# Patient Record
Sex: FEMALE | Race: WHITE | HISPANIC OR LATINO | Employment: FULL TIME | URBAN - METROPOLITAN AREA
[De-identification: names, ages, dates, MRNs, and addresses within clinical notes are randomized per-mention and may not be internally consistent; named-entity substitution may affect disease eponyms.]

---

## 2020-11-18 ENCOUNTER — OFFICE VISIT (OUTPATIENT)
Dept: INTERNAL MEDICINE | Age: 25
End: 2020-11-18

## 2020-11-18 VITALS
OXYGEN SATURATION: 98 % | WEIGHT: 232.6 LBS | TEMPERATURE: 97.5 F | HEIGHT: 64 IN | DIASTOLIC BLOOD PRESSURE: 80 MMHG | HEART RATE: 86 BPM | SYSTOLIC BLOOD PRESSURE: 130 MMHG | BODY MASS INDEX: 39.71 KG/M2

## 2020-11-18 DIAGNOSIS — E66.9 OBESITY (BMI 30-39.9): ICD-10-CM

## 2020-11-18 DIAGNOSIS — Z00.00 ROUTINE ADULT HEALTH MAINTENANCE: Primary | ICD-10-CM

## 2020-11-18 DIAGNOSIS — Z32.00 ENCOUNTER FOR PREGNANCY TEST, RESULT UNKNOWN: ICD-10-CM

## 2020-11-18 PROBLEM — Z20.822 SUSPECTED COVID-19 VIRUS INFECTION: Status: ACTIVE | Noted: 2020-05-13

## 2020-11-18 PROBLEM — O26.899 RH NEGATIVE STATE IN ANTEPARTUM PERIOD: Status: ACTIVE | Noted: 2020-04-16

## 2020-11-18 PROBLEM — J18.9 PNEUMONIA: Status: ACTIVE | Noted: 2020-05-13

## 2020-11-18 PROBLEM — Z67.91 RH NEGATIVE STATE IN ANTEPARTUM PERIOD: Status: ACTIVE | Noted: 2020-04-16

## 2020-11-18 PROBLEM — O43.199 MARGINAL INSERTION OF UMBILICAL CORD AFFECTING MANAGEMENT OF MOTHER: Status: ACTIVE | Noted: 2020-04-16

## 2020-11-18 PROBLEM — Z34.90 PREGNANCY: Status: ACTIVE | Noted: 2020-03-06

## 2020-11-18 PROBLEM — R59.9 ADENOPATHY: Status: ACTIVE | Noted: 2020-11-18

## 2020-11-18 PROBLEM — Z98.891 S/P C-SECTION: Status: ACTIVE | Noted: 2020-07-18

## 2020-11-18 PROBLEM — O02.1 MISSED AB: Status: ACTIVE | Noted: 2019-05-24

## 2020-11-18 PROBLEM — O99.210 OBESITY IN PREGNANCY: Status: ACTIVE | Noted: 2020-03-06

## 2020-11-18 PROCEDURE — 99385 PREV VISIT NEW AGE 18-39: CPT | Performed by: NURSE PRACTITIONER

## 2020-11-18 RX ORDER — NORGESTIMATE AND ETHINYL ESTRADIOL 0.25-0.035
1 KIT ORAL DAILY
COMMUNITY
Start: 2020-08-27 | End: 2021-08-27

## 2020-11-18 RX ORDER — ALBUTEROL SULFATE 90 UG/1
2 AEROSOL, METERED RESPIRATORY (INHALATION) EVERY 4 HOURS PRN
COMMUNITY
End: 2021-03-16 | Stop reason: SDUPTHER

## 2020-11-18 NOTE — PROGRESS NOTES
"[facility]ICINE  COLLEEN Zuniga      Renae RIZO Bottoms / 25 y.o. / female  11/18/2020      VITALS     Visit Vitals  /80   Pulse 86   Temp 97.5 °F (36.4 °C) (Temporal)   Ht 162.6 cm (64\")   Wt 106 kg (232 lb 9.6 oz)   LMP 10/21/2020 (Exact Date)   SpO2 98%   Breastfeeding No   BMI 39.93 kg/m²       BP Readings from Last 3 Encounters:   11/18/20 130/80   02/26/15 130/70     Wt Readings from Last 3 Encounters:   11/18/20 106 kg (232 lb 9.6 oz)   02/26/15 84.9 kg (187 lb 4.1 oz)      Body mass index is 39.93 kg/m².    MEDICATIONS     Current Outpatient Medications   Medication Sig Dispense Refill   • albuterol sulfate  (90 Base) MCG/ACT inhaler Inhale 2 puffs Every 4 (Four) Hours As Needed.     • norgestimate-ethinyl estradiol (ORTHO-CYCLEN) 0.25-35 MG-MCG per tablet Take 1 tablet by mouth Daily.       No current facility-administered medications for this visit.        _____________________________________________________________________________________    CHIEF COMPLAINT     Establish Care and Labs Only      HISTORY OF PRESENT ILLNESS     Renae presents for annual health maintenance visit, labs, and to establish care with office and provider.     She is currently established with obgyn and has a scheduled pap smear in December. She had a csection June of this year and has her first child. 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     Only other medical hisotry is pneumonia in May in which she was hospitalized due concurrent third trimester pregnancy. She has occasional residual wheezing in which she uses albuterol inhaler.     In October she miss a birth control dose and would also like HCG urine testing today.     · Last health maintenance visit: many years ago  · General health: fair  · Lifestyle:  · Attempting to lose weight?: Yes   · Diet: eats decently  · Exercise: exercises 4 days/week; started walking 10,000 steps   · Tobacco: Never used   · Alcohol: " occasional/social  · Work: Full-time; staffing agency   · Reproductive health:  · Sexually active?: No   · Sexual problems?: No problems  · Concern for STD?: No    · Sees Gynecologist?: Yes   · Meredith/Postmenopausal?: No   · Domestic abuse concerns: No   · Depression Screening:      PHQ-2/PHQ-9 Depression Screening 2020   Little interest or pleasure in doing things 0   Feeling down, depressed, or hopeless 0   Total Score 0         PHQ-2: 0 (Not depressed)   PHQ-9: 0 (Negative screening for depression)    Patient Care Team:  Ok Raya APRN as PCP - General (Internal Medicine)  Padmini White MD (Obstetrics and Gynecology)  ______________________________________________________________________    ALLERGIES  No Known Allergies     PFSH:     The following portions of the patient's history were reviewed and updated as appropriate: Allergies / Current Medications / Past Medical History / Surgical History / Social History / Family History    PROBLEM LIST   Patient Active Problem List   Diagnosis   • Adenopathy   • Full-term PROM with onset of labor within 24 hours of rupture   • Marginal insertion of umbilical cord affecting management of mother   • Missed ab   • Obesity in pregnancy   • Pneumonia   • Pregnancy   • Rh negative state in antepartum period   • S/P    • Suspected COVID-19 virus infection   • Obesity (BMI 30-39.9)       PAST MEDICAL HISTORY  Past Medical History:   Diagnosis Date   • Pneumonia 2020       SURGICAL HISTORY  Past Surgical History:   Procedure Laterality Date   •  SECTION      2020       SOCIAL HISTORY  Social History     Socioeconomic History   • Marital status:      Spouse name: Not on file   • Number of children: Not on file   • Years of education: Not on file   • Highest education level: Not on file   Tobacco Use   • Smoking status: Never Smoker   • Smokeless tobacco: Never Used   Substance and Sexual Activity   • Alcohol use: Yes     Comment: social    • Drug use: Never   • Sexual activity: Yes     Partners: Male     Birth control/protection: Other       FAMILY HISTORY  Family History   Problem Relation Age of Onset   • No Known Problems Mother    • Diabetes Father        IMMUNIZATION HISTORY  Immunization History   Administered Date(s) Administered   • HPV Quadrivalent 04/28/2016, 07/11/2016, 11/01/2016   • Influenza, Unspecified 01/05/2020, 10/05/2020   • Rho (D) Immune Globulin 05/21/2019, 05/24/2019, 05/07/2020, 07/19/2020   • Tdap 05/07/2020       ______________________________________________________________________    REVIEW OF SYSTEMS     Review of Systems   Constitutional: Negative.    HENT: Negative.    Eyes: Negative.    Respiratory: Negative.    Cardiovascular: Negative.    Gastrointestinal: Negative.    Endocrine: Negative.    Genitourinary: Negative.    Musculoskeletal:        Intermittent back pain after epidural in June with csection   Skin: Negative.    Allergic/Immunologic: Negative.    Neurological: Negative.    Hematological: Negative.    Psychiatric/Behavioral: Negative.      PHYSICAL EXAMINATION     Physical Exam  Constitutional:       Appearance: Normal appearance. She is obese.   HENT:      Head: Normocephalic.      Right Ear: Tympanic membrane, ear canal and external ear normal.      Left Ear: Tympanic membrane, ear canal and external ear normal.      Nose: Nose normal.      Mouth/Throat:      Mouth: Mucous membranes are moist.   Eyes:      Conjunctiva/sclera: Conjunctivae normal.      Pupils: Pupils are equal, round, and reactive to light.   Neck:      Musculoskeletal: Normal range of motion.      Thyroid: No thyromegaly.   Cardiovascular:      Rate and Rhythm: Normal rate and regular rhythm.      Pulses: Normal pulses.      Heart sounds: Normal heart sounds.   Pulmonary:      Effort: Pulmonary effort is normal.      Breath sounds: Normal breath sounds.   Abdominal:      Palpations: Abdomen is soft.      Tenderness: There is no  abdominal tenderness.   Genitourinary:     Comments: Deferred followed by obgyn  Musculoskeletal: Normal range of motion.      Right lower leg: No edema.      Left lower leg: No edema.   Lymphadenopathy:      Cervical: No cervical adenopathy.      Right cervical: No superficial, deep or posterior cervical adenopathy.     Left cervical: No superficial, deep or posterior cervical adenopathy.   Skin:     General: Skin is warm and dry.   Neurological:      Mental Status: She is alert and oriented to person, place, and time.   Psychiatric:         Thought Content: Thought content normal.         Judgment: Judgment normal.        REVIEWED DATA      Labs:    Lab Results   Component Value Date     07/26/2020    K 4.3 07/26/2020    CALCIUM 9.7 07/26/2020    AST 25 07/26/2020    ALT 30 07/26/2020    BUN 16 07/26/2020    CREATININE 0.7 07/26/2020    CREATININE 0.7 07/20/2020    CREATININE 0.7 07/19/2020    EGFRIFNONA >60 02/26/2015    EGFRIFAFRI >60 02/26/2015       Lab Results   Component Value Date    GLU 80 07/26/2020       No results found for: LDL, HDL, TRIG, CHOLHDLRATIO    No results found for: VXOH48IT     Lab Results   Component Value Date    WBC 10.94 07/26/2020    HGB 10.8 (L) 07/26/2020    MCV 82.2 07/26/2020     (H) 07/26/2020       No results found for: PROTEIN, GLUCOSEU, BLOODU, NITRITEU, LEUKOCYTESUR       Lab Results   Component Value Date    HEPCVIRUSABY Nonreactive 06/26/2020       Imaging:        Medical Tests:        ASSESSMENT & PLAN     ANNUAL WELLNESS EXAM / PHYSICAL     Other medical problems addressed today:      Summary/Discussion:     · For obesity: Decrease/eliminate soda, caffeine, alcohol and overall caloric intake. Reduce carbohydrates and sweets in diet.  Continue to improve dietary habits with lean proteins, fresh vegetables, fruits, and nuts. Improve aerobic exercise: walking/biking/swimming daily as tolerated, recommend 30 minutes/day at least 5 days/week.  · Routine health  maintenance labs performed today. Patient acceptable to review over mychart.   · Annual physical in one year with fasting labs as long as labs today unremarkable.     Next Appointment with me: Visit date not found    Return in about 1 year (around 11/18/2021) for Annual physical.      HEALTHCARE MAINTENANCE ISSUES     Cancer Screening:  · Colon: Initial/Next screening at age: 44  · Repeat colon cancer screening: N/A at this time  · Breast: Recommended monthly self exams; annual professional exam  · Mammogram: N/A at this time  · Cervical: 3 years  · Skin: Monthly self skin examination, annual exam by health professional  · Lung: Does not meet criteria for lung cancer screening.   · Other:    Screening Labs & Tests:  · Lab results reviewed & discussed with the patient or test orders placed today.  · EKG:  · CV Screening: Lipid panel  · DEXA (65+ or postmenopausal with risk factors):   · HEP C (If born 1952-4545, or risk factors): Ordered  · Other:     Immunization/Vaccinations (to be given today unless deferred by patient)  · Influenza: Patient had the flu shot this season  · Hepatitis A: Declined by patient  · Tetanus/Pertussis: Up to date  · Pneumovax: Not needed at this time  · Shingles: Not needed at this time  · Other:     Lifestyle Counseling:  · Lifestyle Modifications: Attempt to lose weight, Continue good lifestyle choices/modifications, Improve dietary compliance, Maintain a low sugar/carbohydrate diet and Follow a low fat, low cholesterol diet  · Safety Issues: Always wear seatbelt, Avoid texting while driving   · Use sunscreen, regular skin examination  · Recommended annual dental/vision examination.  · Emotional/Stress/Sleep: Reviewed and  given when appropriate      Health Maintenance   Topic Date Due   • ANNUAL PHYSICAL  09/05/1997   • PAP SMEAR  11/18/2020   • TDAP/TD VACCINES (2 - Td) 05/07/2030   • HEPATITIS C SCREENING  Completed   • INFLUENZA VACCINE  Completed   • HPV VACCINES  Completed    • Pneumococcal Vaccine 0-64  Aged Out         Examiner was wearing KN95 mask, face shield and exam gloves during the entire duration of the visit. Patient was masked the entire time.   Minimum social distance of 6 ft maintained entire visit except if physical contact was necessary as documented.     **Dragon Disclaimer:   Much of this encounter note is an electronic transcription/translation of spoken language to printed text. The electronic translation of spoken language may permit erroneous, or at times, nonsensical words or phrases to be inadvertently transcribed. Although I have reviewed the note for such errors, some may still exist.

## 2020-11-19 LAB
ALBUMIN SERPL-MCNC: 4.5 G/DL (ref 3.5–5.2)
ALBUMIN/GLOB SERPL: 1.5 G/DL
ALP SERPL-CCNC: 95 U/L (ref 39–117)
ALT SERPL-CCNC: 17 U/L (ref 1–33)
APPEARANCE UR: ABNORMAL
AST SERPL-CCNC: 13 U/L (ref 1–32)
BACTERIA #/AREA URNS HPF: ABNORMAL /HPF
BASOPHILS # BLD AUTO: 0.04 10*3/MM3 (ref 0–0.2)
BASOPHILS NFR BLD AUTO: 0.5 % (ref 0–1.5)
BILIRUB SERPL-MCNC: 0.2 MG/DL (ref 0–1.2)
BILIRUB UR QL STRIP: NEGATIVE
BUN SERPL-MCNC: 9 MG/DL (ref 6–20)
BUN/CREAT SERPL: 11.8 (ref 7–25)
CALCIUM SERPL-MCNC: 9.4 MG/DL (ref 8.6–10.5)
CHLORIDE SERPL-SCNC: 105 MMOL/L (ref 98–107)
CHOLEST SERPL-MCNC: 154 MG/DL (ref 0–200)
CHOLEST/HDLC SERPL: 2.66 {RATIO}
CO2 SERPL-SCNC: 24.9 MMOL/L (ref 22–29)
COLOR UR: YELLOW
CREAT SERPL-MCNC: 0.76 MG/DL (ref 0.57–1)
CRYSTALS URNS MICRO: ABNORMAL
EOSINOPHIL # BLD AUTO: 0.37 10*3/MM3 (ref 0–0.4)
EOSINOPHIL NFR BLD AUTO: 4.7 % (ref 0.3–6.2)
EPI CELLS #/AREA URNS HPF: ABNORMAL /HPF
ERYTHROCYTE [DISTWIDTH] IN BLOOD BY AUTOMATED COUNT: 14.2 % (ref 12.3–15.4)
GLOBULIN SER CALC-MCNC: 3 GM/DL
GLUCOSE SERPL-MCNC: 70 MG/DL (ref 65–99)
GLUCOSE UR QL: NEGATIVE
HBA1C MFR BLD: 5.5 % (ref 4.8–5.6)
HCT VFR BLD AUTO: 40.6 % (ref 34–46.6)
HCV AB S/CO SERPL IA: <0.1 S/CO RATIO (ref 0–0.9)
HDLC SERPL-MCNC: 58 MG/DL (ref 40–60)
HGB BLD-MCNC: 13.1 G/DL (ref 12–15.9)
HGB UR QL STRIP: ABNORMAL
IMM GRANULOCYTES # BLD AUTO: 0.03 10*3/MM3 (ref 0–0.05)
IMM GRANULOCYTES NFR BLD AUTO: 0.4 % (ref 0–0.5)
KETONES UR QL STRIP: NEGATIVE
LDLC SERPL CALC-MCNC: 77 MG/DL (ref 0–100)
LEUKOCYTE ESTERASE UR QL STRIP: NEGATIVE
LYMPHOCYTES # BLD AUTO: 2.67 10*3/MM3 (ref 0.7–3.1)
LYMPHOCYTES NFR BLD AUTO: 33.8 % (ref 19.6–45.3)
MCH RBC QN AUTO: 24.9 PG (ref 26.6–33)
MCHC RBC AUTO-ENTMCNC: 32.3 G/DL (ref 31.5–35.7)
MCV RBC AUTO: 77 FL (ref 79–97)
MONOCYTES # BLD AUTO: 0.38 10*3/MM3 (ref 0.1–0.9)
MONOCYTES NFR BLD AUTO: 4.8 % (ref 5–12)
MUCOUS THREADS URNS QL MICRO: ABNORMAL /HPF
NEUTROPHILS # BLD AUTO: 4.42 10*3/MM3 (ref 1.7–7)
NEUTROPHILS NFR BLD AUTO: 55.8 % (ref 42.7–76)
NITRITE UR QL STRIP: NEGATIVE
NRBC BLD AUTO-RTO: 0 /100 WBC (ref 0–0.2)
PH UR STRIP: 5.5 [PH] (ref 5–8)
PLATELET # BLD AUTO: 239 10*3/MM3 (ref 140–450)
POTASSIUM SERPL-SCNC: 4.8 MMOL/L (ref 3.5–5.2)
PROT SERPL-MCNC: 7.5 G/DL (ref 6–8.5)
PROT UR QL STRIP: NEGATIVE
RBC # BLD AUTO: 5.27 10*6/MM3 (ref 3.77–5.28)
RBC #/AREA URNS HPF: ABNORMAL /HPF
SODIUM SERPL-SCNC: 142 MMOL/L (ref 136–145)
SP GR UR: 1.02 (ref 1–1.03)
T4 FREE SERPL-MCNC: 1.44 NG/DL (ref 0.93–1.7)
TRIGL SERPL-MCNC: 107 MG/DL (ref 0–150)
TSH SERPL DL<=0.005 MIU/L-ACNC: 0.63 UIU/ML (ref 0.27–4.2)
UROBILINOGEN UR STRIP-MCNC: ABNORMAL MG/DL
VLDLC SERPL CALC-MCNC: 19 MG/DL (ref 5–40)
WBC # BLD AUTO: 7.91 10*3/MM3 (ref 3.4–10.8)
WBC #/AREA URNS HPF: ABNORMAL /HPF

## 2021-02-23 ENCOUNTER — TELEPHONE (OUTPATIENT)
Dept: INTERNAL MEDICINE | Age: 26
End: 2021-02-23

## 2021-03-16 RX ORDER — ALBUTEROL SULFATE 90 UG/1
2 AEROSOL, METERED RESPIRATORY (INHALATION) EVERY 4 HOURS PRN
Qty: 6.7 G | Refills: 11 | Status: SHIPPED | OUTPATIENT
Start: 2021-03-16

## 2021-03-22 ENCOUNTER — HOSPITAL ENCOUNTER (OUTPATIENT)
Dept: GENERAL RADIOLOGY | Facility: HOSPITAL | Age: 26
Discharge: HOME | End: 2021-03-22
Admitting: NURSE PRACTITIONER

## 2021-03-22 ENCOUNTER — OFFICE VISIT (OUTPATIENT)
Dept: INTERNAL MEDICINE | Age: 26
End: 2021-03-22

## 2021-03-22 VITALS
WEIGHT: 234.2 LBS | OXYGEN SATURATION: 99 % | BODY MASS INDEX: 39.98 KG/M2 | HEART RATE: 79 BPM | TEMPERATURE: 98.2 F | HEIGHT: 64 IN | SYSTOLIC BLOOD PRESSURE: 120 MMHG | DIASTOLIC BLOOD PRESSURE: 80 MMHG

## 2021-03-22 DIAGNOSIS — R05.9 COUGH: ICD-10-CM

## 2021-03-22 DIAGNOSIS — Z91.09 ENVIRONMENTAL ALLERGIES: Primary | ICD-10-CM

## 2021-03-22 PROCEDURE — 71046 X-RAY EXAM CHEST 2 VIEWS: CPT

## 2021-03-22 PROCEDURE — 99213 OFFICE O/P EST LOW 20 MIN: CPT | Performed by: NURSE PRACTITIONER

## 2021-03-22 RX ORDER — CETIRIZINE HYDROCHLORIDE 10 MG/1
10 TABLET ORAL DAILY
Qty: 30 TABLET | Refills: 11 | Status: SHIPPED | OUTPATIENT
Start: 2021-03-22 | End: 2021-04-13 | Stop reason: ALTCHOICE

## 2021-03-22 NOTE — PROGRESS NOTES
"    I N T E R N A L  M E D I C I N E  CLYDE MCCLELLAND, APRN      ENCOUNTER DATE:  03/22/2021    Renae Mcneill / 25 y.o. / female      CHIEF COMPLAINT / REASON FOR OFFICE VISIT     Cough (productive, green x1.5 wk)      ASSESSMENT & PLAN     1. Environmental allergies  - Continue albuterol as needed  - Ambulatory Referral to Allergy  - Zyrtec 10mg daily     2. Cough  - XR Chest PA & Lateral    Orders Placed This Encounter   Procedures   • XR Chest PA & Lateral   • Ambulatory Referral to Allergy     New Medications Ordered This Visit   Medications   • cetirizine (zyrTEC) 10 MG tablet     Sig: Take 1 tablet by mouth Daily.     Dispense:  30 tablet     Refill:  11       SUMMARY/DISCUSSION  • Follow-up as scheduled, earlier pending imaging.     Next Appointment with me: Visit date not found    No follow-ups on file.      VITAL SIGNS     Visit Vitals  /80   Pulse 79   Temp 98.2 °F (36.8 °C) (Temporal)   Ht 162.6 cm (64\")   Wt 106 kg (234 lb 3.2 oz)   LMP 03/10/2021   SpO2 99%   BMI 40.20 kg/m²       Wt Readings from Last 3 Encounters:   03/22/21 106 kg (234 lb 3.2 oz)   11/18/20 106 kg (232 lb 9.6 oz)   02/26/15 84.9 kg (187 lb 4.1 oz)     Body mass index is 40.2 kg/m².      MEDICATIONS AT THE TIME OF OFFICE VISIT     Current Outpatient Medications on File Prior to Visit   Medication Sig   • albuterol sulfate  (90 Base) MCG/ACT inhaler Inhale 2 puffs Every 4 (Four) Hours As Needed for Shortness of Air.   • norgestimate-ethinyl estradiol (ORTHO-CYCLEN) 0.25-35 MG-MCG per tablet Take 1 tablet by mouth Daily.     No current facility-administered medications on file prior to visit.         HISTORY OF PRESENT ILLNESS     Patient presents with symptoms for 8 days, starting this past Sunday with nasal congestion, sinus pressure and headache. She developed a 102 fever at 0500 that day which quickly resolved. She was tested at the [facility] clinic the day after which was negative. She subsequently developed a productive " cough which has been improving, but has still had to use her albuterol inhaler on a daily basis since symptoms began. Hx pneumonia in May.     REVIEW OF SYSTEMS     Constitutional neg except per HPI   Resp neg except cough  CV neg    PHYSICAL EXAMINATION     Physical Exam  Constitutional  No distress  Cardiovascular Rate  normal . Rhythm: regular . Heart sounds:  normal  Pulmonary/Chest  Effort normal. Breath sounds:  normal  Psychiatric  Alert. Judgment and thought content normal. Mood normal     REVIEWED DATA     Labs:         Imaging:           Medical Tests:             Summary of old records / correspondence / consultant report:           Request outside records:           *Examiner was wearing medical surgical mask, face shield and exam gloves during the entire duration of the visit. Patient was masked the entire time.   Minimum social distance of 6 ft maintained entire visit except if physical contact was necessary as documented.     **Dragon Disclaimer:   Much of this encounter note is an electronic transcription/translation of spoken language to printed text. The electronic translation of spoken language may permit erroneous, or at times, nonsensical words or phrases to be inadvertently transcribed. Although I have reviewed the note for such errors, some may still exist.

## 2021-04-13 RX ORDER — KETOTIFEN FUMARATE 0.35 MG/ML
SOLUTION/ DROPS OPHTHALMIC
COMMUNITY
Start: 2021-04-01

## 2021-04-13 RX ORDER — SUMATRIPTAN 100 MG/1
TABLET, FILM COATED ORAL
COMMUNITY
Start: 2021-04-01

## 2021-04-13 RX ORDER — FEXOFENADINE HCL 180 MG/1
180 TABLET ORAL DAILY
COMMUNITY
Start: 2021-04-01

## 2021-04-16 ENCOUNTER — BULK ORDERING (OUTPATIENT)
Dept: CASE MANAGEMENT | Facility: OTHER | Age: 26
End: 2021-04-16

## 2021-04-16 DIAGNOSIS — Z23 IMMUNIZATION DUE: ICD-10-CM

## 2024-02-27 ENCOUNTER — OFFICE VISIT (OUTPATIENT)
Dept: SURGERY | Facility: CLINIC | Age: 29
End: 2024-02-27
Payer: COMMERCIAL

## 2024-02-27 VITALS
HEIGHT: 64 IN | OXYGEN SATURATION: 95 % | DIASTOLIC BLOOD PRESSURE: 78 MMHG | SYSTOLIC BLOOD PRESSURE: 110 MMHG | HEART RATE: 76 BPM | BODY MASS INDEX: 42.29 KG/M2 | WEIGHT: 247.7 LBS

## 2024-02-27 DIAGNOSIS — K62.5 RECTAL BLEEDING: ICD-10-CM

## 2024-02-27 DIAGNOSIS — K64.4 EXTERNAL HEMORRHOIDS WITH COMPLICATION: Primary | ICD-10-CM

## 2024-02-27 PROCEDURE — 99204 OFFICE O/P NEW MOD 45 MIN: CPT | Performed by: PHYSICIAN ASSISTANT

## 2024-02-27 RX ORDER — HYDROCORTISONE 25 MG/G
CREAM TOPICAL
Qty: 30 G | Refills: 1 | Status: SHIPPED | OUTPATIENT
Start: 2024-02-27 | End: 2024-02-27

## 2024-02-27 RX ORDER — HYDROCORTISONE 25 MG/G
CREAM TOPICAL
Qty: 30 G | Refills: 1 | Status: SHIPPED | OUTPATIENT
Start: 2024-02-27

## 2024-02-27 NOTE — PROGRESS NOTES
"Renae Mcneill is a 28 y.o. female in for follow up of hemorrhoids.    Has had issues with constipation and hemorrhoids during pregnancy in 2020. Symptoms improved after giving birth. Then was pregnant with twins, which exacerbated symptoms again. Gave birth to twins in July 2023. Bleeding at least every other day, usually just on toilet paper, sometimes on stool. Painful and itchy. Has BM at least twice per day. Stool consistency is pasty. No straining with BMs. No current fiber supplements or stool softeners. Was previously using [employer] brand hemorrhoid cream, which helped to soothe symptoms slightly, but it didn't shrink the hemorrhoids.     No known family history of colon cancer or colon polyps. No prior colonoscopy.    /78 (BP Location: Left arm, Patient Position: Sitting, Cuff Size: Large Adult)   Pulse 76   Ht 162.6 cm (64\")   Wt 112 kg (247 lb 11.2 oz)   SpO2 95%   BMI 42.52 kg/m²   Body mass index is 42.52 kg/m².  -  Physical Exam  No acute distress  Chaperone present  Perianal exam: external hemorrhoids mildly to moderately enlarged with 4 skin tags noted. CALEB: No masses.  Pain elicited. Anoscopy deferred due to patient discomfort.    Assessment: No diagnosis found.     Plan:  ***    {Time Spent (Optional):68328}    Rosalva Woods PA-C  Physician Assistant  Colorectal Surgery    "

## 2024-02-27 NOTE — PROGRESS NOTES
Renae Mcneill is a 28 y.o. female who is seen as a self-referred a consult for hemorrhoids.      HPI:  Has had issues with constipation and hemorrhoids during pregnancy in . Symptoms improved after giving birth. Then was pregnant with twins, which exacerbated symptoms again. Gave birth to twins in 2023. Bleeding at least every other day, usually just on toilet paper, sometimes on stool. Painful and itchy. Has BM at least twice per day. Stool consistency is pasty. No straining with BMs. No current fiber supplements or stool softeners. Was previously using [employer] brand hemorrhoid cream, which helped to soothe symptoms slightly, but it didn't shrink the hemorrhoids.      No known family history of colon cancer or colon polyps. No prior colonoscopy.    Past Medical History:   Diagnosis Date    Anemia 2023    History of transfusion 2023    Pneumonia 2020    Rectal bleeding     Duribg First pregnancy       Past Surgical History:   Procedure Laterality Date     SECTION N/A 2020       Social History:   reports that she has never smoked. She has never been exposed to tobacco smoke. She has never used smokeless tobacco. She reports that she does not currently use alcohol. She reports that she does not use drugs.      Marriage status:     Family History   Problem Relation Age of Onset    No Known Problems Mother     Diabetes Father        No current outpatient medications on file.    Allergy  Patient has no known allergies.    Vitals:    24 1054   BP: 110/78   Pulse: 76   SpO2: 95%   -  Body mass index is 42.52 kg/m².    Physical Exam  No acute distress  Chaperone present  Perianal exam: external hemorrhoids mildly to moderately enlarged with 4 skin tags noted. CALEB: No masses.  Pain elicited. Anoscopy deferred due to patient discomfort.     Assessment:  1. External hemorrhoids with complication    2. Rectal bleeding        Plan:  For the hemorrhoids, I advised the patient  to start a fiber supplement to a daily total intake of 30 to 40 g for stool consistency.  I provided detailed instructions.  I encouraged adequate water intake and MiraLAX as needed to keep stools soft.  I prescribed Anusol cream for hemorrhoids and gave instructions for use, including that cream is not for daily long-term use. Follow up in 4-6 weeks for reevaluation. Call with any questions, concerns, or worsening symptoms.        Rosalva Woods PA-C  Physician Assistant  Colorectal Surgery

## 2024-03-01 ENCOUNTER — PATIENT ROUNDING (BHMG ONLY) (OUTPATIENT)
Dept: SURGERY | Facility: CLINIC | Age: 29
End: 2024-03-01
Payer: COMMERCIAL

## 2024-03-01 NOTE — PROGRESS NOTES
March 1, 2024    Hello, may I speak with Renae Mcneill?    My name is Husam      I am  with [facility]  [address]  [place] [address] [phone].    Before we get started may I verify your date of birth? 1995    I am calling to officially welcome you to our practice and ask about your recent visit. Is this a good time to talk? yes    Tell me about your visit with us. What things went well?  Everything was fine.        We're always looking for ways to make our patients' experiences even better. Do you have recommendations on ways we may improve?  no    Overall were you satisfied with your first visit to our practice? yes       I appreciate you taking the time to speak with me today. Is there anything else I can do for you? no      Thank you, and have a great day.

## 2024-04-08 PROBLEM — Z30.2 REQUEST FOR STERILIZATION: Status: ACTIVE | Noted: 2021-07-08

## 2024-04-08 PROBLEM — J45.40 MODERATE PERSISTENT ASTHMA: Status: ACTIVE | Noted: 2021-07-08

## 2024-04-08 PROBLEM — Z87.59 HISTORY OF MISCARRIAGE: Status: ACTIVE | Noted: 2022-12-20

## 2024-04-08 PROBLEM — O14.20 HELLP (HEMOLYTIC ANEMIA/ELEV LIVER ENZYMES/LOW PLATELETS IN PREGNANCY): Status: ACTIVE | Noted: 2023-07-04

## 2024-04-08 RX ORDER — ALBUTEROL SULFATE 90 UG/1
2 AEROSOL, METERED RESPIRATORY (INHALATION)
COMMUNITY
Start: 2023-11-04

## 2024-04-22 ENCOUNTER — OFFICE VISIT (OUTPATIENT)
Dept: SURGERY | Facility: CLINIC | Age: 29
End: 2024-04-22
Payer: COMMERCIAL

## 2024-04-22 VITALS
HEART RATE: 95 BPM | WEIGHT: 252.3 LBS | HEIGHT: 64 IN | BODY MASS INDEX: 43.07 KG/M2 | OXYGEN SATURATION: 98 % | DIASTOLIC BLOOD PRESSURE: 60 MMHG | SYSTOLIC BLOOD PRESSURE: 114 MMHG

## 2024-04-22 DIAGNOSIS — K62.5 RECTAL BLEEDING: ICD-10-CM

## 2024-04-22 DIAGNOSIS — K64.4 ANAL SKIN TAG: Primary | ICD-10-CM

## 2024-04-22 PROCEDURE — 46600 DIAGNOSTIC ANOSCOPY SPX: CPT | Performed by: PHYSICIAN ASSISTANT

## 2024-04-22 PROCEDURE — 99213 OFFICE O/P EST LOW 20 MIN: CPT | Performed by: PHYSICIAN ASSISTANT

## 2024-04-22 RX ORDER — SODIUM CHLORIDE, SODIUM LACTATE, POTASSIUM CHLORIDE, CALCIUM CHLORIDE 600; 310; 30; 20 MG/100ML; MG/100ML; MG/100ML; MG/100ML
30 INJECTION, SOLUTION INTRAVENOUS CONTINUOUS
OUTPATIENT
Start: 2024-04-22

## 2024-04-22 NOTE — PROGRESS NOTES
"Renae Mcneill is a 28 y.o. female in for follow up of hemorrhoids, rectal bleeding.    Rectal bleeding and pain have subsided. Having difficulty with hygiene, as well as irritation, due to extra tissue around anus. Fiber has helped stool consistency. Has BM 2 times per day. [place] 5 stool consistency. Sometimes strains if forgets fiber pill. Taking 1 fiber capsule daily.  Cream helped.     /60 (BP Location: Left arm, Patient Position: Sitting, Cuff Size: Large Adult)   Pulse 95   Ht 162.6 cm (64\")   Wt 114 kg (252 lb 4.8 oz)   LMP  (LMP Unknown)   SpO2 98%   Breastfeeding No   BMI 43.31 kg/m²   Body mass index is 43.31 kg/m².  -  Physical Exam  No acute distress  Chaperone present  Perianal exam: medium to large circumferential skin tags. CALEB: no masses. Anoscopy performed: Grade 2-3 x 3 internal hemorrhoids    Assessment:   1. Anal skin tag    2. Rectal bleeding       Plan:  Schedule colonoscopy and excision of anal skin tag. I discussed risks, benefits, and alternatives with the patient, including pain, bleeding, infection, scar tissue formation, and injury to surrounding structures. The patient had the opportunity to ask questions.  I answered all questions. Patient understands and wishes to proceed with procedure.     Rosalva Woods PA-C  Physician Assistant  Colorectal Surgery    "

## 2024-05-29 ENCOUNTER — TELEPHONE (OUTPATIENT)
Dept: SURGERY | Facility: CLINIC | Age: 29
End: 2024-05-29

## 2024-05-29 NOTE — TELEPHONE ENCOUNTER
Caller: Renae Mcneill    Relationship to patient: Self    Best call back number: [phone]    Patient is needing: PATIENT HAS SOME QUESTIONS REGARDING HER PREP FOR HER COLONOSCOPY ON THIS FRIDAY. PLEASE CALL.

## 2024-05-31 ENCOUNTER — TELEPHONE (OUTPATIENT)
Dept: SURGERY | Facility: CLINIC | Age: 29
End: 2024-05-31
Payer: COMMERCIAL

## 2024-05-31 ENCOUNTER — ANESTHESIA (OUTPATIENT)
Dept: GASTROENTEROLOGY | Facility: HOSPITAL | Age: 29
End: 2024-05-31
Payer: COMMERCIAL

## 2024-05-31 ENCOUNTER — ANESTHESIA EVENT (OUTPATIENT)
Dept: GASTROENTEROLOGY | Facility: HOSPITAL | Age: 29
End: 2024-05-31
Payer: COMMERCIAL

## 2024-05-31 ENCOUNTER — HOSPITAL ENCOUNTER (OUTPATIENT)
Facility: HOSPITAL | Age: 29
Setting detail: HOSPITAL OUTPATIENT SURGERY
Discharge: HOME | End: 2024-05-31
Attending: COLON & RECTAL SURGERY | Admitting: COLON & RECTAL SURGERY
Payer: COMMERCIAL

## 2024-05-31 VITALS
DIASTOLIC BLOOD PRESSURE: 79 MMHG | WEIGHT: 248.6 LBS | HEART RATE: 82 BPM | HEIGHT: 64 IN | RESPIRATION RATE: 20 BRPM | BODY MASS INDEX: 42.44 KG/M2 | SYSTOLIC BLOOD PRESSURE: 115 MMHG | OXYGEN SATURATION: 98 %

## 2024-05-31 DIAGNOSIS — K64.4 ANAL SKIN TAG: ICD-10-CM

## 2024-05-31 DIAGNOSIS — K62.5 RECTAL BLEEDING: ICD-10-CM

## 2024-05-31 PROCEDURE — 45378 DIAGNOSTIC COLONOSCOPY: CPT | Performed by: COLON & RECTAL SURGERY

## 2024-05-31 PROCEDURE — 25010000002 PHENYLEPHRINE 10 MG/ML SOLUTION

## 2024-05-31 PROCEDURE — 25810000003 LACTATED RINGERS PER 1000 ML: Performed by: PHYSICIAN ASSISTANT

## 2024-05-31 PROCEDURE — 46230 REMOVAL OF ANAL TAGS: CPT | Performed by: COLON & RECTAL SURGERY

## 2024-05-31 PROCEDURE — 88304 TISSUE EXAM BY PATHOLOGIST: CPT | Performed by: COLON & RECTAL SURGERY

## 2024-05-31 PROCEDURE — 25010000002 PROPOFOL 1000 MG/100ML EMULSION

## 2024-05-31 PROCEDURE — 25010000002 KETOROLAC TROMETHAMINE PER 15 MG

## 2024-05-31 RX ORDER — LIDOCAINE 50 MG/G
1 OINTMENT TOPICAL EVERY 4 HOURS PRN
Qty: 35.44 G | Refills: 4 | Status: SHIPPED | OUTPATIENT
Start: 2024-05-31 | End: 2024-06-30

## 2024-05-31 RX ORDER — LIDOCAINE HYDROCHLORIDE 20 MG/ML
INJECTION, SOLUTION INFILTRATION; PERINEURAL AS NEEDED
Status: DISCONTINUED | OUTPATIENT
Start: 2024-05-31 | End: 2024-05-31 | Stop reason: SURG

## 2024-05-31 RX ORDER — SODIUM CHLORIDE 9 MG/ML
40 INJECTION, SOLUTION INTRAVENOUS AS NEEDED
Status: CANCELLED | OUTPATIENT
Start: 2024-05-31

## 2024-05-31 RX ORDER — PROPOFOL 10 MG/ML
INJECTION, EMULSION INTRAVENOUS CONTINUOUS PRN
Status: DISCONTINUED | OUTPATIENT
Start: 2024-05-31 | End: 2024-05-31 | Stop reason: SURG

## 2024-05-31 RX ORDER — HYDROCODONE BITARTRATE AND ACETAMINOPHEN 5; 325 MG/1; MG/1
TABLET ORAL
Qty: 32 TABLET | Refills: 0 | Status: SHIPPED | OUTPATIENT
Start: 2024-05-31

## 2024-05-31 RX ORDER — SODIUM CHLORIDE 0.9 % (FLUSH) 0.9 %
10 SYRINGE (ML) INJECTION AS NEEDED
Status: CANCELLED | OUTPATIENT
Start: 2024-05-31

## 2024-05-31 RX ORDER — PHENYLEPHRINE HYDROCHLORIDE 10 MG/ML
INJECTION INTRAVENOUS AS NEEDED
Status: DISCONTINUED | OUTPATIENT
Start: 2024-05-31 | End: 2024-05-31 | Stop reason: SURG

## 2024-05-31 RX ORDER — SODIUM CHLORIDE, SODIUM LACTATE, POTASSIUM CHLORIDE, CALCIUM CHLORIDE 600; 310; 30; 20 MG/100ML; MG/100ML; MG/100ML; MG/100ML
30 INJECTION, SOLUTION INTRAVENOUS CONTINUOUS
Status: DISCONTINUED | OUTPATIENT
Start: 2024-05-31 | End: 2024-05-31 | Stop reason: HOSPADM

## 2024-05-31 RX ORDER — BUPIVACAINE HYDROCHLORIDE AND EPINEPHRINE 5; 5 MG/ML; UG/ML
INJECTION, SOLUTION EPIDURAL; INTRACAUDAL; PERINEURAL AS NEEDED
Status: DISCONTINUED | OUTPATIENT
Start: 2024-05-31 | End: 2024-05-31 | Stop reason: HOSPADM

## 2024-05-31 RX ORDER — POLYETHYLENE GLYCOL 3350 17 G/17G
17 POWDER, FOR SOLUTION ORAL 2 TIMES DAILY
Start: 2024-05-31

## 2024-05-31 RX ORDER — KETOROLAC TROMETHAMINE 30 MG/ML
INJECTION, SOLUTION INTRAMUSCULAR; INTRAVENOUS AS NEEDED
Status: DISCONTINUED | OUTPATIENT
Start: 2024-05-31 | End: 2024-05-31 | Stop reason: SURG

## 2024-05-31 RX ADMIN — PROPOFOL INJECTABLE EMULSION 100 MG: 10 INJECTION, EMULSION INTRAVENOUS at 11:19

## 2024-05-31 RX ADMIN — PROPOFOL INJECTABLE EMULSION 200 MG: 10 INJECTION, EMULSION INTRAVENOUS at 11:26

## 2024-05-31 RX ADMIN — PHENYLEPHRINE HYDROCHLORIDE 150 MCG: 10 INJECTION INTRAVENOUS at 11:39

## 2024-05-31 RX ADMIN — KETOROLAC TROMETHAMINE 30 MG: 30 INJECTION, SOLUTION INTRAMUSCULAR at 11:41

## 2024-05-31 RX ADMIN — SODIUM CHLORIDE, POTASSIUM CHLORIDE, SODIUM LACTATE AND CALCIUM CHLORIDE 30 ML/HR: 600; 310; 30; 20 INJECTION, SOLUTION INTRAVENOUS at 10:36

## 2024-05-31 RX ADMIN — PROPOFOL INJECTABLE EMULSION 160 MCG/KG/MIN: 10 INJECTION, EMULSION INTRAVENOUS at 11:20

## 2024-05-31 RX ADMIN — LIDOCAINE HYDROCHLORIDE 60 MG: 20 INJECTION, SOLUTION INFILTRATION; PERINEURAL at 11:19

## 2024-05-31 NOTE — OP NOTE
Surgeon: Joycelyn Jean MD    Surgical        Preoperative diagnosis: Anal skin tag [K64.4]  Rectal bleeding [K62.5]    Post-Op Diagnosis Codes:     * Anal skin tag [K64.4]     * Rectal bleeding [K62.5]    Procedure: .ANAL TAG EXCISION,x4 * Panel 2 does not exist *    Estimated Blood Loss: 100ml    Specimens:   Specimens       ID Source Type Tests Collected By Collected At Frozen?    A Anus Tissue TISSUE PATHOLOGY EXAM   Joycelyn Jean MD 5/31/24 1150     Description: ANAL TAGS           Order Name Source Comment Collection Info Order Time   TISSUE PATHOLOGY EXAM Anus  Collected By: Joycelyn Jean MD 5/31/2024 11:50 AM     Release to patient   Routine Release            Indication:  Renae Mcneill is a 28 y.o. female who comes in with Anal skin tag    Rectal bleeding  Patient understands risks, benefits,and alternatives wishes to proceed.      Procedure Details: After colonoscopy was done patient remained on the left lateral decubitus position.  Patient was numbed with quarter percent Marcaine with epi.  I made elliptical incision around each skin tag.  There is a total of 4.  I excised it using the scalpel.  I then closed the incisions with 3-0 Vicryl in a running fashion.  Patient tolerated well.

## 2024-05-31 NOTE — DISCHARGE INSTRUCTIONS
For the next 24 hours patient needs to be with a responsible adult.    For 24 hours DO NOT drive, operate machinery, appliances, drink alcohol, make important decisions or sign legal documents.    Start with a light or bland diet and advance to regular diet as tolerated.    Follow recommendations on procedure report provided by your doctor.    Call Dr Jean for problems [phone]    Problems may include but not limited to: large amounts of bleeding, trouble breathing, repeated vomiting, severe unrelieved pain, fever or chills.

## 2024-05-31 NOTE — ANESTHESIA POSTPROCEDURE EVALUATION
Patient: Renae Mcneill    Procedure Summary       Date: 05/31/24 Room / Location: [facility] ENDOSCOPY 6 /  [facility]    Anesthesia Start: 1115 Anesthesia Stop: 1156    Procedures:       COLONOSCOPY TO CECUM      ANAL TAG EXCISION (Anus) Diagnosis:       Anal skin tag      Rectal bleeding      (Anal skin tag [K64.4])      (Rectal bleeding [K62.5])    Surgeons: Joycelyn Jean MD Provider: Emilia Marino MD    Anesthesia Type: MAC ASA Status: 3            Anesthesia Type: MAC    Vitals  Vitals Value Taken Time   /79 05/31/24 1215   Temp     Pulse 73 05/31/24 1223   Resp 20 05/31/24 1214   SpO2 99 % 05/31/24 1223   Vitals shown include unfiled device data.        Post Anesthesia Care and Evaluation    Patient location during evaluation: bedside  Patient participation: complete - patient participated  Level of consciousness: awake and alert  Pain management: adequate    Airway patency: patent  Anesthetic complications: No anesthetic complications  PONV Status: controlled  Cardiovascular status: acceptable  Respiratory status: acceptable  Hydration status: acceptable    
No

## 2024-05-31 NOTE — ANESTHESIA PREPROCEDURE EVALUATION
" Anesthesia Evaluation     Patient summary reviewed and Nursing notes reviewed   NPO Solid Status: > 8 hours  NPO Liquid Status: > 2 hours           Airway   Mallampati: III  TM distance: >3 FB  Neck ROM: full  Difficult intubation highly probable, Small opening and Anterior  Dental - normal exam     Pulmonary    (+) asthma,  Cardiovascular     (+) hypertension      Neuro/Psych  GI/Hepatic/Renal/Endo    (+) obesity, morbid obesity, GI bleeding lower     Musculoskeletal     Abdominal   (+) obese   Substance History      OB/GYN          Other                      Anesthesia Plan    ASA 3     MAC     (I have reviewed the patient's history and chart with the patient, including all pertinent laboratory results and imaging. I have explained the risks of anesthesia including but not limited to dental damage, corneal abrasion, nerve injury, MI, stroke, aspiration, and death. Patient has agreed to proceed.      /68 (BP Location: Left arm, Patient Position: Lying)   Pulse 80   Resp 10   Ht 162.6 cm (64\")   Wt 113 kg (248 lb 9.6 oz)   LMP  (LMP Unknown)   SpO2 96%   BMI 42.67 kg/m²   )  intravenous induction     Anesthetic plan, risks, benefits, and alternatives have been provided, discussed and informed consent has been obtained with: patient.    CODE STATUS:         "

## 2024-05-31 NOTE — H&P
Renae Mcneill is a 28 y.o. female  who is referred by Joycelyn Jean MD for a colonoscopy. She   has an indications: rectal bleeding.     She denies any change in bowel function, melena, or hematochezia.    Past Medical History:   Diagnosis Date    Anemia 2023    History of transfusion 2023    Pneumonia 2020    Rectal bleeding     Duribg First pregnancy    Skin tag of rectum        Past Surgical History:   Procedure Laterality Date     SECTION N/A     ,     DILATATION AND CURETTAGE         Medications Prior to Admission   Medication Sig Dispense Refill Last Dose    albuterol sulfate  (90 Base) MCG/ACT inhaler Inhale 2 puffs 4 (Four) Times a Day.   More than a month       No Known Allergies    Family History   Problem Relation Age of Onset    No Known Problems Mother     Diabetes Father     Malig Hyperthermia Neg Hx        Social History     Socioeconomic History    Marital status:    Tobacco Use    Smoking status: Never     Passive exposure: Never    Smokeless tobacco: Never   Vaping Use    Vaping status: Never Used   Substance and Sexual Activity    Alcohol use: Not Currently     Comment: social    Drug use: Never    Sexual activity: Yes     Partners: Male     Birth control/protection: Tubal ligation, Hysterectomy       Review of Systems   Gastrointestinal:  Negative for abdominal pain, nausea and vomiting.   All other systems reviewed and are negative.      Vitals:    24 1037   BP: 115/68   Pulse: 80   Resp: 10   SpO2: 96%         Physical Exam  Constitutional:       General: She is not in acute distress.     Appearance: She is well-developed.   HENT:      Head: Normocephalic and atraumatic.   Abdominal:      General: There is no distension.      Palpations: Abdomen is soft.   Musculoskeletal:         General: Normal range of motion.   Neurological:      Mental Status: She is alert.   Psychiatric:         Thought Content: Thought content normal.            Assessment & Plan      indications: rectal bleeding + anal tags         I recommend colonoscopy and possible anal tag excision.  I described risks, benefits of the procedure with the patient including but not limited to bleeding, infection, possibility of perforation and possible polypectomy. All of the patient's questions were answered and they would like to proceed with the above recommendations.

## 2024-06-01 LAB
LAB AP CASE REPORT: NORMAL
PATH REPORT.FINAL DX SPEC: NORMAL
PATH REPORT.GROSS SPEC: NORMAL

## 2024-06-18 ENCOUNTER — OFFICE VISIT (OUTPATIENT)
Dept: SURGERY | Facility: CLINIC | Age: 29
End: 2024-06-18
Payer: COMMERCIAL

## 2024-06-18 VITALS
HEIGHT: 64 IN | HEART RATE: 63 BPM | OXYGEN SATURATION: 99 % | DIASTOLIC BLOOD PRESSURE: 70 MMHG | BODY MASS INDEX: 42.24 KG/M2 | SYSTOLIC BLOOD PRESSURE: 124 MMHG | WEIGHT: 247.4 LBS

## 2024-06-18 DIAGNOSIS — K64.4 ANAL SKIN TAG: Primary | ICD-10-CM

## 2024-06-18 PROCEDURE — 99213 OFFICE O/P EST LOW 20 MIN: CPT | Performed by: PHYSICIAN ASSISTANT

## 2024-06-18 NOTE — PROGRESS NOTES
"Renae Mcneill is a 28 y.o. female in for follow up of anal skin tags x4 s/p excision at time of colonoscopy on 5/31/2024.    Discomfort and bleeding are improving over time. She feels so much better with the tags gone. I reviewed the colonoscopy report by Dr. Joycelyn Jean performed on 5/31/2024, which was normal, other than skin tags.    /70 (BP Location: Right arm, Patient Position: Sitting, Cuff Size: Adult)   Pulse 63   Ht 162.6 cm (64\")   Wt 112 kg (247 lb 6.4 oz)   LMP  (LMP Unknown)   SpO2 99%   Breastfeeding No   BMI 42.47 kg/m²   Body mass index is 42.47 kg/m².  -  Physical Exam  No acute distress  Chaperone present  Perianal exam: healing, mildly swollen tissue at procedure sites without erythema, drainage, or fluctuance.     Assessment:   1. Anal skin tag    - s/p excision x4 at time of colonoscopy on 5/31/2024    Plan:  Follow up as needed    Rosalva Woods PA-C  Physician Assistant  Colorectal Surgery    "

## (undated) DEVICE — LN SMPL CO2 SHTRM SD STREAM W/M LUER

## (undated) DEVICE — TUBING, SUCTION, 1/4" X 10', STRAIGHT: Brand: MEDLINE

## (undated) DEVICE — ADAPT CLN BIOGUARD AIR/H2O DISP

## (undated) DEVICE — SENSR O2 OXIMAX FNGR A/ 18IN NONSTR

## (undated) DEVICE — KT ORCA ORCAPOD DISP STRL

## (undated) DEVICE — CANN O2 ETCO2 FITS ALL CONN CO2 SMPL A/ 7IN DISP LF